# Patient Record
Sex: MALE | URBAN - METROPOLITAN AREA
[De-identification: names, ages, dates, MRNs, and addresses within clinical notes are randomized per-mention and may not be internally consistent; named-entity substitution may affect disease eponyms.]

---

## 2021-01-26 ENCOUNTER — OFFICE VISIT (OUTPATIENT)
Dept: URBAN - METROPOLITAN AREA TELEHEALTH 2 | Facility: TELEHEALTH | Age: 31
End: 2021-01-26
Payer: SELF-PAY

## 2021-01-26 ENCOUNTER — DASHBOARD ENCOUNTERS (OUTPATIENT)
Age: 31
End: 2021-01-26

## 2021-01-26 ENCOUNTER — OFFICE VISIT (OUTPATIENT)
Dept: URBAN - METROPOLITAN AREA CLINIC 98 | Facility: CLINIC | Age: 31
End: 2021-01-26

## 2021-01-26 DIAGNOSIS — Z72.0 TOBACCO USE: ICD-10-CM

## 2021-01-26 DIAGNOSIS — R07.9 CHEST PAIN: ICD-10-CM

## 2021-01-26 DIAGNOSIS — R07.89 ACUTE CHEST WALL PAIN: ICD-10-CM

## 2021-01-26 DIAGNOSIS — K21.9 GERD: ICD-10-CM

## 2021-01-26 DIAGNOSIS — R05 COUGH: ICD-10-CM

## 2021-01-26 PROBLEM — 235595009 GASTROESOPHAGEAL REFLUX DISEASE: Status: ACTIVE | Noted: 2021-01-26

## 2021-01-26 PROCEDURE — G8427 DOCREV CUR MEDS BY ELIG CLIN: HCPCS | Performed by: INTERNAL MEDICINE

## 2021-01-26 PROCEDURE — 99203 OFFICE O/P NEW LOW 30 MIN: CPT | Performed by: INTERNAL MEDICINE

## 2021-01-26 RX ORDER — OMEPRAZOLE 20 MG/1
1 CAPSULE 30 MINUTES BEFORE MORNING MEAL CAPSULE, DELAYED RELEASE ORAL ONCE A DAY
Status: ACTIVE | COMMUNITY

## 2021-01-26 RX ORDER — IBUPROFEN 600 MG/1
1 TABLET WITH FOOD OR MILK AS NEEDED TABLET ORAL THREE TIMES A DAY
Status: ACTIVE | COMMUNITY

## 2021-01-26 NOTE — HPI-OTHER HISTORIES
chest pain seen in [facility] ER cardiac work up negative smoker's cough GERD symptoms takes omeprazole daily

## 2021-01-27 ENCOUNTER — OFFICE VISIT (OUTPATIENT)
Dept: URBAN - METROPOLITAN AREA CLINIC 92 | Facility: CLINIC | Age: 31
End: 2021-01-27

## 2021-02-10 ENCOUNTER — OFFICE VISIT (OUTPATIENT)
Dept: URBAN - METROPOLITAN AREA SURGERY CENTER 18 | Facility: SURGERY CENTER | Age: 31
End: 2021-02-10